# Patient Record
Sex: MALE | Race: WHITE | URBAN - METROPOLITAN AREA
[De-identification: names, ages, dates, MRNs, and addresses within clinical notes are randomized per-mention and may not be internally consistent; named-entity substitution may affect disease eponyms.]

---

## 2017-02-13 ENCOUNTER — HISTORICAL (OUTPATIENT)
Dept: LAB | Facility: HOSPITAL | Age: 47
End: 2017-02-13

## 2019-03-27 ENCOUNTER — HISTORICAL (OUTPATIENT)
Dept: ADMINISTRATIVE | Facility: HOSPITAL | Age: 49
End: 2019-03-27

## 2019-03-27 LAB
PROLACTIN LEVEL (OHS): 11 NG/ML (ref 2.5–17.4)
TESTOST SERPL-MCNC: 2132 NG/DL (ref 241–827)

## 2019-06-26 ENCOUNTER — HISTORICAL (OUTPATIENT)
Dept: ADMINISTRATIVE | Facility: HOSPITAL | Age: 49
End: 2019-06-26

## 2019-06-26 LAB
BUN SERPL-MCNC: 20 MG/DL (ref 7–18)
CALCIUM SERPL-MCNC: 9.5 MG/DL (ref 8.5–10.1)
CHLORIDE SERPL-SCNC: 105 MMOL/L (ref 98–107)
CO2 SERPL-SCNC: 33 MMOL/L (ref 21–32)
CREAT SERPL-MCNC: 1.3 MG/DL (ref 0.6–1.3)
CREAT/UREA NIT SERPL: 15
FSH SERPL-ACNC: <0.2 MIU/ML (ref 1.5–15)
GLUCOSE SERPL-MCNC: 90 MG/DL (ref 74–106)
LH SERPL-ACNC: <0.2 MIU/ML (ref 1.2–10.6)
POTASSIUM SERPL-SCNC: 4.3 MMOL/L (ref 3.5–5.1)
SODIUM SERPL-SCNC: 141 MMOL/L (ref 136–145)
TESTOST SERPL-MCNC: 753 NG/DL (ref 241–827)

## 2019-07-11 ENCOUNTER — HISTORICAL (OUTPATIENT)
Dept: RADIOLOGY | Facility: HOSPITAL | Age: 49
End: 2019-07-11

## 2019-07-24 ENCOUNTER — HISTORICAL (OUTPATIENT)
Dept: ADMINISTRATIVE | Facility: HOSPITAL | Age: 49
End: 2019-07-24

## 2019-10-30 ENCOUNTER — HISTORICAL (OUTPATIENT)
Dept: ADMINISTRATIVE | Facility: HOSPITAL | Age: 49
End: 2019-10-30

## 2019-10-30 LAB
FSH SERPL-ACNC: 0.8 MIU/ML (ref 1.5–15)
LH SERPL-ACNC: 0.8 MIU/ML (ref 1.2–10.6)
TESTOST SERPL-MCNC: 156 NG/DL (ref 241–827)

## 2020-01-29 ENCOUNTER — HISTORICAL (OUTPATIENT)
Dept: ADMINISTRATIVE | Facility: HOSPITAL | Age: 50
End: 2020-01-29

## 2020-01-29 LAB
ABS NEUT (OLG): 3.39 X10(3)/MCL (ref 2.1–9.2)
BASOPHILS # BLD AUTO: 0.1 X10(3)/MCL (ref 0–0.2)
BASOPHILS NFR BLD AUTO: 1 %
BUN SERPL-MCNC: 21 MG/DL (ref 7–18)
CALCIUM SERPL-MCNC: 8.8 MG/DL (ref 8.5–10.1)
CHLORIDE SERPL-SCNC: 106 MMOL/L (ref 98–107)
CO2 SERPL-SCNC: 31 MMOL/L (ref 21–32)
CREAT SERPL-MCNC: 1.2 MG/DL (ref 0.6–1.3)
CREAT/UREA NIT SERPL: 18
EOSINOPHIL # BLD AUTO: 0.1 X10(3)/MCL (ref 0–0.9)
EOSINOPHIL NFR BLD AUTO: 1 %
ERYTHROCYTE [DISTWIDTH] IN BLOOD BY AUTOMATED COUNT: 14.2 % (ref 11.5–14.5)
GLUCOSE SERPL-MCNC: 79 MG/DL (ref 74–106)
HCT VFR BLD AUTO: 44.2 % (ref 40–51)
HGB BLD-MCNC: 13.9 GM/DL (ref 13.5–17.5)
IMM GRANULOCYTES # BLD AUTO: 0.02 10*3/UL
IMM GRANULOCYTES NFR BLD AUTO: 0 %
LYMPHOCYTES # BLD AUTO: 1.7 X10(3)/MCL (ref 0.6–4.6)
LYMPHOCYTES NFR BLD AUTO: 29 %
MCH RBC QN AUTO: 27.3 PG (ref 26–34)
MCHC RBC AUTO-ENTMCNC: 31.4 GM/DL (ref 31–37)
MCV RBC AUTO: 86.7 FL (ref 80–100)
MONOCYTES # BLD AUTO: 0.6 X10(3)/MCL (ref 0.1–1.3)
MONOCYTES NFR BLD AUTO: 11 %
NEUTROPHILS # BLD AUTO: 3.39 X10(3)/MCL (ref 2.1–9.2)
NEUTROPHILS NFR BLD AUTO: 58 %
PLATELET # BLD AUTO: 199 X10(3)/MCL (ref 130–400)
PMV BLD AUTO: 11.2 FL (ref 7.4–10.4)
POTASSIUM SERPL-SCNC: 4.1 MMOL/L (ref 3.5–5.1)
PSA SERPL-MCNC: 0.5 NG/ML
RBC # BLD AUTO: 5.1 X10(6)/MCL (ref 4.5–5.9)
SODIUM SERPL-SCNC: 141 MMOL/L (ref 136–145)
TESTOST SERPL-MCNC: >1500 NG/DL (ref 300–1060)
WBC # SPEC AUTO: 5.8 X10(3)/MCL (ref 4.5–11)

## 2020-02-21 ENCOUNTER — HISTORICAL (OUTPATIENT)
Dept: ADMINISTRATIVE | Facility: HOSPITAL | Age: 50
End: 2020-02-21

## 2020-02-21 LAB
FSH SERPL-ACNC: <1 MIU/ML (ref 1.5–15)
LH SERPL-ACNC: <0.2 MIU/ML (ref 1.2–10.6)

## 2020-06-11 ENCOUNTER — HISTORICAL (OUTPATIENT)
Dept: LAB | Facility: HOSPITAL | Age: 50
End: 2020-06-11

## 2020-06-11 LAB
ALBUMIN SERPL-MCNC: 3.9 GM/DL (ref 3.4–5)
ALBUMIN/GLOB SERPL: 1.1 RATIO (ref 1.1–2)
ALP SERPL-CCNC: 66 UNIT/L (ref 45–117)
ALT SERPL-CCNC: 44 UNIT/L (ref 12–78)
AST SERPL-CCNC: 25 UNIT/L (ref 15–37)
BILIRUB SERPL-MCNC: 0.7 MG/DL (ref 0.2–1)
BILIRUBIN DIRECT+TOT PNL SERPL-MCNC: 0.2 MG/DL (ref 0–0.2)
BILIRUBIN DIRECT+TOT PNL SERPL-MCNC: 0.5 MG/DL
BUN SERPL-MCNC: 18 MG/DL (ref 7–18)
CALCIUM SERPL-MCNC: 8.8 MG/DL (ref 8.5–10.1)
CHLORIDE SERPL-SCNC: 106 MMOL/L (ref 98–107)
CHOLEST SERPL-MCNC: 176 MG/DL
CHOLEST/HDLC SERPL: 2.2 {RATIO} (ref 0–5)
CO2 SERPL-SCNC: 29 MMOL/L (ref 21–32)
CREAT SERPL-MCNC: 1.2 MG/DL (ref 0.6–1.3)
ERYTHROCYTE [DISTWIDTH] IN BLOOD BY AUTOMATED COUNT: 13.3 % (ref 11.5–14.5)
ESTRADIOL SERPL HS-MCNC: <24 PG/ML
FT4I SERPL CALC-MCNC: 2.74 (ref 2.6–3.6)
GLOBULIN SER-MCNC: 3.5 GM/ML (ref 2.3–3.5)
GLUCOSE SERPL-MCNC: 87 MG/DL (ref 74–106)
HCT VFR BLD AUTO: 41.6 % (ref 40–51)
HDLC SERPL-MCNC: 80 MG/DL (ref 40–59)
HGB BLD-MCNC: 13.4 GM/DL (ref 13.5–17.5)
LDLC SERPL CALC-MCNC: 86 MG/DL
LH SERPL-ACNC: <0.2 MIU/ML (ref 1.2–10.6)
MCH RBC QN AUTO: 29.1 PG (ref 26–34)
MCHC RBC AUTO-ENTMCNC: 32.2 GM/DL (ref 31–37)
MCV RBC AUTO: 90.4 FL (ref 80–100)
PLATELET # BLD AUTO: 183 X10(3)/MCL (ref 130–400)
PMV BLD AUTO: 11.2 FL (ref 7.4–10.4)
POTASSIUM SERPL-SCNC: 4 MMOL/L (ref 3.5–5.1)
PROT SERPL-MCNC: 7.4 GM/DL (ref 6.4–8.2)
RBC # BLD AUTO: 4.6 X10(6)/MCL (ref 4.5–5.9)
SODIUM SERPL-SCNC: 140 MMOL/L (ref 136–145)
T3FREE SERPL-MCNC: 3.02 PG/ML (ref 2.18–3.98)
T3RU NFR SERPL: 35.65 % (ref 31–39)
T4 SERPL-MCNC: 7.68 UG/DL (ref 4.87–11.72)
TRIGL SERPL-MCNC: 51 MG/DL
TSH SERPL-ACNC: 2.23 MIU/L (ref 0.36–3.74)
VLDLC SERPL CALC-MCNC: 10 MG/DL
WBC # SPEC AUTO: 4.7 X10(3)/MCL (ref 4.5–11)

## 2020-09-24 ENCOUNTER — HISTORICAL (OUTPATIENT)
Dept: LAB | Facility: HOSPITAL | Age: 50
End: 2020-09-24

## 2020-09-24 LAB
ESTRADIOL SERPL HS-MCNC: <24 PG/ML
TESTOST SERPL-MCNC: 1483.55 NG/DL (ref 240.24–870.68)

## 2021-06-18 ENCOUNTER — HISTORICAL (OUTPATIENT)
Dept: INTERNAL MEDICINE | Facility: CLINIC | Age: 51
End: 2021-06-18

## 2021-06-18 LAB — ESTRADIOL SERPL HS-MCNC: <24 PG/ML

## 2022-04-30 NOTE — PROGRESS NOTES
Patient:   Jon Frazier            MRN: [id number]            FIN: [id number]               Age:   49 years     Sex:  Male     :  1970   Associated Diagnoses:   None   Author:   Mendel Salinas MD      Pt continues to have supertherapeutic T levels on and off as well as hormonal axis suppression with LH FSH.  This indicates some type exogenous supplementation despite our repeated conversastions about such.  If he was only taking clomiphene as Rx then he would have some elevation of LH and FSH to normal range and also would not cause he T to be that high.  I am stopping his Rx and discharging him from my clinic for noncompliance.

## 2022-05-03 NOTE — HISTORICAL [facility] CERNER
This is a historical note converted from [facility]. Formatting and pictures may have been removed.  Please reference [facility] for original formatting and attached multimedia. Chief Complaint  f/u for fertility issues  History of Present Illness  Pt with fertility issues.? Low sperm count on SA.? Was taking OTC workout supplement with T>1500 and axis suppression.? Prolactin mild elevation.? MRI pituitary negative.? Here to discuss such.? We redrew his labs last visit and his T was 2100 and his LH FSH were essentially 0.? He is using exogenous source as there is no other physiologic explanation for such.?  ?   Also mild ED responding to viagra.? Denies CV disease/DM/HTN/new meds.? He is stressed over wedding planning, inferility, unemployment.  ?   He states he got off his T booster but is still taking some [employer] protein mix and prelit pre work out.? I assume they have androgen.? We also talked about the rare change of a testicular or adrenal lesion.?  Review of Systems  12 point ROS negative other than HPI  Physical Exam  Vitals & Measurements  T:?36.4? ?C (Oral)? HR:?68(Peripheral)? RR:?18? BP:?142/85? SpO2:?97%? WT:?111.3?kg?  GEN: WNWD NAD  HEENT: NCAT  CV: RRR  RESP: unlabored  ABD: soft NT/ND  : Bladder nondistended  EXT: no C/C/E  NEURO: AAOx4 no focal deficits  Assessment/Plan  1.?Impotence?N52.9  -Will get testicular US and CT adrenal protocol  -Repeat SA in 3 months if he actually stops exogenous.? It is a waste of time otherwise.  -Cont viagra and behavior mod   Problem List/Past Medical History  Ongoing  Back pain  [place] palsy  Excessive daytime sleepiness  Gastroesophageal reflux disease  Hypoxemia  Impotence  Obesity  EMILY (obstructive sleep apnea)  Pneumonia  S/P bariatric surgery  Snoring  Teeth grinding  Tobacco user  Varicose veins  Historical  Sleep apnea  Procedure/Surgical History  Laparoscopic Vertical (Sleeve) Gastrectomy (10/30/2012)  Gastic Sleeve (2011)  lt ring finger pin placement    Medications  amoxicillin-clavulanate 875 mg-125 mg oral tablet, 1 tab(s), Oral, BID,? ?Not taking  Anusol-HC 2.5% rectal cream with applicator  calcium polycarbophil 625 mg oral tablet,? ?Not taking  clindamycin IVPB, 900 mg= 50 mL, IV Piggyback, Once  DIAZepam 10 mg oral tablet,? ?Not taking  ibuprofen 800 mg oral tablet, 800 mg= 1 tab(s), Oral, TID  Pantoprazole 40 mg ORAL EC-Tablet, 40 mg= 1 tab(s), Oral, Daily  prednisONE 20 mg oral tablet, 20 mg= 1 tab(s), Oral, BID,? ?Not taking  Viagra 50 mg oral tablet, 50 mg= 1 tab(s), Oral, Daily  Allergies  No Known Allergies  Social History  Alcohol - Denies Alcohol Use, 10/29/2012  Past, Beer, Liquor, Daily, 10/30/2012  Employment/School  Unemployed, 03/27/2019  Exercise  Exercise duration: 120. Exercise frequency: 5-6 times/week. Self assessment: Good condition. Exercise type: Weight lifting., 03/27/2019  Home/Environment  Lives with friend. Living situation: Home/Independent. Home equipment: CPAP/BiPAP. Alcohol abuse in household: No. Substance abuse in household: No. Smoker in household: No. Feels unsafe at home: No. Safe place to go: Yes. Family/Friends available for support: Yes. Concern for family members at home: No. Major illness in household: No. Financial concerns: No. TV/Computer concerns: No., 03/27/2019  Nutrition/Health  Regular, 03/27/2019  Sexual  Sexually active: Yes. Sexual orientation: Straight or heterosexual. Gender Identity Identifies as male., 03/27/2019  Substance Use  Past, alchol, 10/30/2012  Tobacco  Former smoker, quit more than 30 days ago, N/A, 05/08/2019  Former smoker, Cigarettes, 03/07/2018  Current every day smoker, Cigarettes, 8 per day., 10/14/2016  Past, 10 per day., 10/29/2012  Family History  Atrial fibrillation: Father.  HTN (hypertension) 27-APR-2016 23:35:49<$>: Mother.  Health Maintenance  Health Maintenance  ???Pending?(in the next year)  ??? ??OverDue  ??? ? ? ?Alcohol Misuse Screening due??01/01/19??and every  1??year(s)  ??? ? ? ?Smoking Cessation due??01/01/19??and every 1??year(s)  ??? ??Due?  ??? ? ? ?ADL Screening due??06/26/19??and every 1??year(s)  ??? ? ? ?Aspirin Therapy for CVD Prevention due??06/26/19??and every 1??year(s)  ??? ? ? ?Diabetes Screening due??06/26/19??and every?  ??? ? ? ?Lipid Screening due??06/26/19??and every?  ??? ? ? ?Tetanus Vaccine due??06/26/19??and every 10??year(s)  ??? ??Due In Future?  ??? ? ? ?Obesity Screening not due until??01/01/20??and every 1??year(s)  ??? ? ? ?Depression Screening not due until??03/26/20??and every 1??year(s)  ??? ? ? ?Blood Pressure Screening not due until??05/07/20??and every 1??year(s)  ??? ? ? ?Body Mass Index Check not due until??05/07/20??and every 1??year(s)  ???Satisfied?(in the past 1 year)  ??? ??Satisfied?  ??? ? ? ?Blood Pressure Screening on??06/26/19.??Satisfied by [facility] DEANN, Adrianne  ??? ? ? ?Body Mass Index Check on??06/26/19.??Satisfied by [facility] LPN, Adrianne  ??? ? ? ?Depression Screening on??06/26/19.??Satisfied by [facility] LPN, Adrianne  ??? ? ? ?Obesity Screening on??06/26/19.??Satisfied by [facility] LPN, Adrianne  ?

## 2022-05-03 NOTE — HISTORICAL [facility] C[facility]
This is a historical note converted from [facility]. Formatting and pictures may have been removed.  Please reference [facility] for original formatting and attached multimedia. Chief Complaint  new referral for increased testosterone, increase prolactin  History of Present Illness  Pt with fertility issues.? Low sperm count on SA.? Was taking OTC workuot supplement with T>1500 and axis suppression.? Prolactin mild elevation.? MRI pituitary negative.? Her eto discuss such.  ?  Also mild ED responding to viagra.? Denies CV disease/DM/HTN/new meds.? He is stressed over wedding planning, inferility, unemployment.  Review of Systems  12 point ROS negative other than HPI  Physical Exam  Vitals & Measurements  T:?36.5? ?C (Oral)? HR:?75(Peripheral)? RR:?18? BP:?154/82? WT:?112.1?kg?  GEN: WNWD NAD  HEENT: NCAT  CV: RRR  RESP: unlabored  ABD: soft NT/ND  : NEMG  EXT: no C/C/E  NEURO: AAOx4 no focal deficits  Assessment/Plan  1.?Impotence  -Repeat labs today.? If still axis suppression clomid x3 months.?  -Repeat SA in 3 months  -Cont viagra and behavior mod  Ordered:  Clinic Follow up  Follicle Stimulating Hormone-[facility] [id number]  Luteinizing Hormone-[facility] [id number]  Office/Outpatient Visit Level 3 Established [id number] PC  Prolactin  Testosterone Level Total  ?   Problem List/Past Medical History  Ongoing  Back pain  [place] palsy  Excessive daytime sleepiness  Gastroesophageal reflux disease  Hypoxemia  Impotence  Obesity  EMILY (obstructive sleep apnea)  Pneumonia  S/P bariatric surgery  Snoring  Teeth grinding  Tobacco user  Varicose veins  Historical  Sleep apnea  Procedure/Surgical History  Laparoscopic Vertical (Sleeve) Gastrectomy (10/30/2012)  Gastic Sleeve (2011)  lt ring finger pin placement   Medications  clindamycin IVPB, 900 mg= 50 mL, IV Piggyback, Once  Viagra 50 mg oral tablet, 50 mg= 1 tab(s), Oral, Daily  Allergies  No Known Allergies  Social History  Alcohol - Denies Alcohol Use, 10/29/2012  Past, Beer, Liquor, Daily,  10/30/2012  Employment/School  Unemployed, 03/27/2019  Exercise  Exercise duration: 120. Exercise frequency: 5-6 times/week. Self assessment: Good condition. Exercise type: Weight lifting., 03/27/2019  Home/Environment  Lives with friend. Living situation: Home/Independent. Home equipment: CPAP/BiPAP. Alcohol abuse in household: No. Substance abuse in household: No. Smoker in household: No. Feels unsafe at home: No. Safe place to go: Yes. Family/Friends available for support: Yes. Concern for family members at home: No. Major illness in household: No. Financial concerns: No. TV/Computer concerns: No., 03/27/2019  Nutrition/Health  Regular, 03/27/2019  Sexual  Sexually active: Yes. Sexual orientation: Straight or heterosexual. Gender Identity Identifies as male., 03/27/2019  Substance Abuse  Past, alchol, 10/30/2012  Tobacco  Former smoker, Cigarettes, 03/07/2018  Current every day smoker, Cigarettes, 8 per day., 10/14/2016  Past, 10 per day., 10/29/2012  Family History  Atrial fibrillation: Father.  HTN (hypertension) 27-APR-2016 23:35:49<$>: Mother.  Health Maintenance  Health Maintenance  ???Pending?(in the next year)  ??? ??Due?  ??? ? ? ?ADL Screening due??03/27/19??and every 1??year(s)  ??? ? ? ?Alcohol Misuse Screening due??03/27/19??and every 1??year(s)  ??? ? ? ?Aspirin Therapy for CVD Prevention due??03/27/19??and every 1??year(s)  ??? ? ? ?Diabetes Screening due??03/27/19??and every?  ??? ? ? ?Lipid Screening due??03/27/19??and every?  ??? ? ? ?Tetanus Vaccine due??03/27/19??and every 10??year(s)  ??? ??Due In Future?  ??? ? ? ?Smoking Cessation not due until??04/25/19??and every 1??year(s)  ??? ? ? ?Blood Pressure Screening not due until??03/26/20??and every 1??year(s)  ??? ? ? ?Body Mass Index Check not due until??03/26/20??and every 1??year(s)  ??? ? ? ?Depression Screening not due until??03/26/20??and every 1??year(s)  ???Satisfied?(in the past 1 year)  ??? ??Satisfied?  ??? ? ? ?Blood Pressure  Screening on??03/27/19.??Satisfied by Abdiel Trent RN  ??? ? ? ?Body Mass Index Check on??03/27/19.??Satisfied by Abdiel Trent RN  ??? ? ? ?Depression Screening on??03/27/19.??Satisfied by Abdiel Trent RN  ??? ? ? ?Obesity Screening on??03/27/19.??Satisfied by Abdiel rTent RN  ??? ? ? ?Smoking Cessation on??04/25/18.??Satisfied by Deb Amaya RRT  ?  ?

## 2022-05-03 NOTE — HISTORICAL [facility] C[facility]
This is a historical note converted from [facility]ary. Formatting and pictures may have been removed.  Please reference [facility]ary for original formatting and attached multimedia. Procedure Name  Date/Time:7/24/2019 10:17:18  Procedure:??Right?Knee Injection  Indications:??Diagnostic and Therapeutic Indication - decrease pain, increase range of motion and improve quality of life  RISKS: Possible complications with injection include?bleeding, infection (0.01%), tendon rupture, steroid flare, fat pad or soft tissue atrophy, skin depigmentation, and vasovagal response. ?(Steroid flair treatment is rest, ice, NSAIDs and resolves in 24-36 hours.)  Consent:???Procedure, risks, benefits, & alternatives explained to patient, who voiced understanding and agreed to proceed with procedure. ?Consent signed and?scanned into the medical record. No absolute contraindications (cellulitis overlying joint, infection, lack of informed consent, allergy to injection mediation, lauren protein or egg allergy for sodium hyaluronate, or history of steroid flare) or relative contraindications (brittle or out of control DM HgA1c > 10, coagulopathy INR > 3.5, previous joint replacement, or history of AVN).  Description:?Time out performed. The patient was prepped?using Chlorhexidine scrub after the appropriate?identification of anatomic landmarks.? Sterile needle used (size # 21 gauge, length 1.5 inch)?Topical anesthetic of ethyl chloride was used.? ?5 mL of 1% lidocaine plain with 40 mg of Kenalog injected.  Complications:?None?  EBL:??None  Post Procedure:?Patient reports improvement in pain and ROM. Patient alert, moving all extremities. ?Good peripheral pulses, no signs of vascular compromise, range of motion intact. ?Patient tolerated procedure well. ?Aftercare instructions were given to patient at time of discharge.??Relative rest for 3 days - avoiding excessive activity. ?Pendulum stretching exercises followed by stretching exercises  daily?starting on day 4.? Place ice on area for 15 minutes every 4 to 6 hours.??Tylenol 1000mg twice a day or ibuprofen 600 mg three times per day for next 3-4 days if not on medication already. ?Protect the area for the next 1-8 hours if anesthetic was used. ?Avoid excessive activity for next 3 to 4 weeks.?ER precautions for fever, severe joint pain, or allergic reaction or other new symptoms related to joint injection.

## 2022-05-03 NOTE — HISTORICAL [facility] CERNER
This is a historical note converted from Zachary. Formatting and pictures may have been removed.  Please reference Zachary for original formatting and attached multimedia. Chief Complaint  referral for right knee pain. c/o bilateral knee pain  History of Present Illness  49?yo?male?non-smoker?  Today:??New?patient presents to ortho clinic for? ?initial visit?for???bilateral ?knee? ?pain? BMI??decreased to???32%?;? reports right knee?pain?as a result of an injury?when he was trying to restrain a?patient?and he kicked his knee?2-3 times he feels?a right medial?knee burning pain?with certain positions.?  Onset:??April 2019????fluctuates in intensity  Current pain level: 7/10?R>L? points to?medial knee? ?without pain medication. Quality described as??sharp?.? Patient?denies?use of pain medication today.?  Medications r/t complaint: Patient reports using?RX / OTC?medications in past including:?OTC pain relievers,?ibuprofen 800 mg, Toradol?RX per PCP. ?Currently taking?same??PRN?pain with?no?relief.?  Modifying Factors: ?Worse with/after activity;?Improved with rest;??Stiffness after immobilization??Stiffness improved with less than 30 minutes of activity  Injury:?trying to restrain a?patient?and he kicked his knee?2-3 times he feels?a right medial?knee  Previous treatment:?HEP???denies??; RX NSAIDs, PT??denies ?, CSI denies , VS denies  Associated Symptoms:?Crepitus/Grinding; ?No numbness or tingling;??No swelling;?No skin changes;?Weakness of right knee without falls;?Mild decrease in ROM;?difficulty sleeping at night s/t pain  Activity:?Sedentary, full ADLs;?Pain interferes with function/daily activity (mild)?Patient?denies?use of assistive devices??for assistance with ambulation.  PMH:? denies CVD; denies DM ; denies RA; denies gout; denies RX anticoagulants; denies intolerance to NSAIDs s/t GI issues; denies ?intolerance to NSAIDs s/t kidney disease  Family History:?Family history of arthritis  PCP:?Eun Trent NP @  [facility] [place], [place]??, referral source Gene Mcneill NP [facility] [place]  Employment:? 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 ?employed  Note:??none  Review of Systems  Constitutional:No fever;No chills;No weight loss  CV:No swelling;No edema  GI:No urinary retention;No urinary incontinence  :No fecal incontinence  Skin:No rash;No wound  Neuro:No numbness/tingling;No weakness;No saddle anesthesia  MSK: As above  Psych:No depression;No anxiety  Heme/Lymph:No easy bruising;No easy bleeding;No lymphadenopathy  Immuno:No MRSA history  Physical Exam  Vitals & Measurements  HT:?186?cm? WT:?108.8?kg? BMI:?31.45?  MSK: ?Bilateral??KNEE  General: No apparent distress;?well-nourished  Inspection:?limping;??full weight bearing;??no swelling;?no erythema;?No contusion;?atrophy / deconditioning noted- mild quad?right;?normal alignment?  Palpation:?tenderness noted at lateral and medial joint lines;tenderness noted at medial retinaculum?Crepitus:?Negative;?Normal temperature  ROM:?  ?????Active Extension/Flexion (0-140):?0-140 (L); 5-120 (R)  Strength:?  ?????Flexion??4/5  ?????Extension??4/5  Special Tests:  ?????a) Effusion Testing:  ??????????Ballotable Effusion: ?Negative  ??????????Fluid Wave:?Negative  ?????b) Patellar Testing:  ??????????Patellar Grind: ?Negative  ??????????Apprehension:?Negative  ??????????Patella?Facet?Tenderness:?Negative  ?????c) Meniscal Tear Assessment:?  ??????????Noe:?Positive?right; negative left  ??????????Apleys Compression:?Positive? right; negative left  ?????d) Ligamentous Testing:  ?????????ACL Anterior Drawer:?Negative  ?????????PCL Posterior Drawer:?Negative  ??? ?????LCL Varus Test:?Negative  ?????????MCL Valgus Test:?Negative?????  Neurovascular:?Intact; 2+?distal pulse, sensation intact to light touch  Neuro/Psych: Awake, Alert, Oriented; Normal mood and affect  Lymphatic: No LAD  Skin and Soft Tissue: No bruising, No  ecchymosis; No rash; Skin intact  Assessment/Plan  1.?Injury of meniscus of right knee?S83.8X1A  ?1.? Discussed with patient diagnosis and treatment recommendations.? Handout given.  2.? Imaging:?Radiological studies ordered,?reviewed,?and independently interpreted by me; discussed with patient.  3.? Treatment plan: Conservative treatment to include oral glucosamine 1500 mg/day; Topical capsaicin as needed; Hot or cold therapy; Adequate vitamin C/D intake  4.? Procedure:?Discussed CSI vs VS injections as treatment options; since conservative measures did not improve symptoms patient consented for CSI today?right knee only  5.? Activity:?Activity as tolerated; HEP to included aerobic conditioning with non-painful activity and ROM/STG exercises;?recommend exercise bike with RPM set at 80 or higher build to 40 minutes 3xs per week as tolerated; ?proper footwear; assistive device to avoid limping;?rubber band provided for HEP and soft knee splint provided for PRN comfort?  6.? Therapy:?Formal PT/OT ordered  7.? Medication:?First line treatment with daily ?acetaminophen 1000 mg three times daily; Medication precautions given; continue medications as RX per PCP; prescribe meloxicam daily for symptom relief, medication precautions given.  8.? RTC:?3 months  9.? Additional work-up:??None  Ordered:  Lidocaine inj., 5 mL, form: Injection, Intra-Articular, Once, first dose 07/24/19 9:58:00 CDT, stop date 07/24/19 9:58:00 CDT  triamcinolone, 40 mg, form: Injection, Intra-Articular, Once, first dose 07/24/19 9:58:00 CDT, stop date 07/24/19 9:58:00 CDT  Clinic Follow up, *Est. 10/24/19 3:00:00 CDT, Order for future visit, Injury of meniscus of right knee, [facility]  Office/Outpatient Visit Level 4 Established 25802 PC, Injury of meniscus of right knee, [facility] 25, 07/24/19 9:58:00 CDT  ?  2.?Osteoarthritis of knees, bilateral?M17.0  ?Same as above  ?  3.?Obesity?E66.9  Patient educated that diet modifications with low  impact exercises as tolerated for reduction in BMI would improve overall treatment and outcome.?  ?  Orders:  meloxicam, 15 mg = 1 tab(s), Oral, Daily, with food; Do NOT take with other NSAIDs, # 30 tab(s), 3 Refill(s), Pharmacy: [id number]  asp/inj jnt/bursa, major [id number] PC, 07/24/19 9:58:00 CDT, [facility], Routine, 07/24/19 9:58:00 CDT  Referrals  Clinic Follow up, *Est. 10/24/19 3:00:00 CDT, Order for future visit, Injury of meniscus of right knee, [facility]   Problem List/Past Medical History  Ongoing  Back pain  Abercrombie palsy  Excessive daytime sleepiness  Former smoker  Gastroesophageal reflux disease  Hypoxemia  Impotence  Obesity  EMILY (obstructive sleep apnea)  Pneumonia  S/P bariatric surgery  Snoring  Teeth grinding  Tobacco user  Varicose veins  Historical  Sleep apnea  Procedure/Surgical History  Laparoscopic Vertical (Sleeve) Gastrectomy (10/30/2012)  Gastic Sleeve (2011)  lt ring finger pin placement   Medications  amoxicillin-clavulanate 875 mg-125 mg oral tablet, 1 tab(s), Oral, BID,? ?Not taking  Anusol-HC 2.5% rectal cream with applicator,? ?Not taking  calcium polycarbophil 625 mg oral tablet,? ?Not taking  clindamycin IVPB, 900 mg= 50 mL, IV Piggyback, Once  DIAZepam 10 mg oral tablet, 10 mg= 1 tab(s), Oral, TID,? ?Not taking  ibuprofen 800 mg oral tablet, 800 mg= 1 tab(s), Oral, TID,? ?Not taking  Kenalog-40 injectable suspension, 40 mg= 1 mL, Intra-Articular, Once  Lidocaine 1% PF 5ml inj, 5 mL, Intra-Articular, Once  Linzess 145 mcg oral capsule, 145 mcg= 1 cap(s), Oral, Daily  Mobic 15 mg oral tablet, 15 mg= 1 tab(s), Oral, Daily, 3 refills  Pantoprazole 40 mg ORAL EC-Tablet, 40 mg= 1 tab(s), Oral, Daily  prednisONE 20 mg oral tablet, 20 mg= 1 tab(s), Oral, BID,? ?Not taking  Toradol 10 mg oral tablet, 10 mg= 1 tab(s), Oral, QID,? ?Not Taking, Completed Rx  Viagra 50 mg oral tablet, 50 mg= 1 tab(s), Oral, Daily  Allergies  No Known Allergies  Social  History  Abuse/Neglect  No, 07/10/2019  Alcohol - Denies Alcohol Use, 10/29/2012  Past, Beer, Liquor, Daily, 10/30/2012  Employment/School  Unemployed, 03/27/2019  Exercise  Exercise duration: 120. Exercise frequency: 5-6 times/week. Self assessment: Good condition. Exercise type: Weight lifting., 03/27/2019  Home/Environment  Lives with friend. Living situation: Home/Independent. Home equipment: CPAP/BiPAP. Alcohol abuse in household: No. Substance abuse in household: No. Smoker in household: No. Feels unsafe at home: No. Safe place to go: Yes. Family/Friends available for support: Yes. Concern for family members at home: No. Major illness in household: No. Financial concerns: No. TV/Computer concerns: No., 03/27/2019  Nutrition/Health  Regular, 03/27/2019  Sexual  Sexually active: Yes. Sexual orientation: Straight or heterosexual. Gender Identity Identifies as male., 03/27/2019  Substance Use  Past, alchol, 10/30/2012  Tobacco  Former smoker, quit more than 30 days ago, N/A, 07/24/2019  Family History  Atrial fibrillation: Father.  HTN (hypertension) 27-APR-2016 23:35:49<$>: Mother.  Health Maintenance  Health Maintenance  ???Pending?(in the next year)  ??? ??OverDue  ??? ? ? ?Diabetes Screening due??and every?  ??? ? ? ?Alcohol Misuse Screening due??01/01/19??and every 1??year(s)  ??? ? ? ?Smoking Cessation due??01/01/19??and every 1??year(s)  ??? ??Due?  ??? ? ? ?ADL Screening due??07/24/19??and every 1??year(s)  ??? ? ? ?Aspirin Therapy for CVD Prevention due??07/24/19??and every 1??year(s)  ??? ? ? ?Lipid Screening due??07/24/19??and every?  ??? ? ? ?Tetanus Vaccine due??07/24/19??and every 10??year(s)  ??? ??Due In Future?  ??? ? ? ?Obesity Screening not due until??01/01/20??and every 1??year(s)  ??? ? ? ?Depression Screening not due until??06/25/20??and every 1??year(s)  ??? ? ? ?Blood Pressure Screening not due until??07/09/20??and every 1??year(s)  ??? ? ? ?Body Mass Index Check not due  until??07/09/20??and every 1??year(s)  ???Satisfied?(in the past 1 year)  ??? ??Satisfied?  ??? ? ? ?Blood Pressure Screening on??07/24/19.??Satisfied by Adrianne Hills LPN  ??? ? ? ?Body Mass Index Check on??07/24/19.??Satisfied by Delmy Jones RN  ??? ? ? ?Depression Screening on??07/24/19.??Satisfied by Adrianne Hills LPN  ??? ? ? ?Diabetes Screening on??06/26/19.??Satisfied by Xavier Bowser Jr.  ??? ? ? ?Obesity Screening on??07/24/19.??Satisfied by Delmy Jones RN  ?  Diagnostic Results  07/24/2019 09:41 CDT XR Knee Left 4 or More Views  Radiology Report  Comparison: Radiograph contralateral knee used for comparison dated  7/24/2019  INDICATION: Bilateral knee pain.  FINDINGS:  Right knee: The bones are well-mineralized and appear intact. Medial  compartment joint space narrowing and hypertrophic change.  Moderate-sized knee joint effusion. Patella is anatomic.  Left knee: The bones are well-mineralized and appear intact. Medial  compartment joint space narrowing and hypertrophic change. Small knee  joint effusion. Patella is anatomic. Dystrophic soft tissue  calcification seen to the medial leg.  IMPRESSION:  Relatively mild osteoarthritic changes are present bilaterally and  worst in the medial compartments.  Mild to moderate bilateral knee joint effusions which are nonspecific  07/24/2019 09:41 CDT XR Knee Right 4 or More Views  Radiology Report  Comparison: Radiograph contralateral knee used for comparison dated  7/24/2019  INDICATION: Bilateral knee pain.  FINDINGS:  Right knee: The bones are well-mineralized and appear intact. Medial  compartment joint space narrowing and hypertrophic change.  Moderate-sized knee joint effusion. Patella is anatomic.  Left knee: The bones are well-mineralized and appear intact. Medial  compartment joint space narrowing and hypertrophic change. Small knee  joint effusion. Patella is anatomic. Dystrophic soft tissue  calcification seen to the medial  leg.  IMPRESSION:  Relatively mild osteoarthritic changes are present bilaterally and  worst in the medial compartments.  Mild to moderate bilateral knee joint effusions which are nonspecific

## 2022-05-03 NOTE — HISTORICAL [facility] CERNER
This is a historical note converted from Zachary. Formatting and pictures may have been removed.  Please reference Zachary for original formatting and attached multimedia. Chief Complaint  rtc 3 months with impotence  History of Present Illness  Pt with fertility issues.? Low sperm count on SA.? Was taking OTC workout supplement with T>1500 and axis suppression.? Prolactin mild elevation.? MRI pituitary negative.? Here to discuss such.? We redrew his labs last visit and his T was 2100 and his LH FSH were essentially 0.? He is using exogenous source as there is no other physiologic explanation for such.?  ?   Also mild ED responding to viagra.? Denies CV disease/DM/HTN/new meds.? He is stressed over wedding planning, infertility, unemployment.  ?   He states he got off his T booster but is still taking some [employer] protein mix and prelit pre work out.? I assume they have androgen.? We also talked about the rare change of a testicular or adrenal lesion.? CT and testicular US negative.  ?   Labs in June shows low FSH and LH.? T did come down from 2132 in March to 753.? He had SA at [facility].? I am waiting on results.? We are going to redraw his labs today as well.  Review of Systems  12 point ROS negative other than HPI  Physical Exam  Vitals & Measurements  T:?36.8? ?C (Oral)? HR:?59(Peripheral)? RR:?20? BP:?135/72? WT:?110.1?kg?  GEN: WNWD NAD  HEENT: NCAT  CV: RRR  RESP: unlabored  ABD: soft NT/ND  : NEMG  EXT: no C/C/E  NEURO: AAOx4 no focal deficits  Assessment/Plan  1.?Impotence?N52.9  -Testicular US and CT adrenal protocol negative  -Repeat SA in 3 months if he actually stops exogenous.? It is a waste of time otherwise.  -Cont viagra and behavior mod?  -His labs in June shows his T level has come down greatly.? His LH and FSH are low likely from hormonal axis suppression.? Will start clomid 50mg daily.? Labs today and repeat labs in 3 months.? I will review his SA.  Ordered:  clomiPHENE, 50 mg = 1 tab(s), Oral,  Daily, X 30 day(s), # 30 tab(s), 5 Refill(s), Pharmacy: [facility] #[id number]  Clinic Follow up, *Est. 01/30/20 3:00:00 CST, Order for future visit, Impotence, [facility]  Follicle Stimulating Hormone Level, Routine collect, 10/30/19 8:16:00 CDT, Blood, Order for future visit, Stop date 10/30/19 8:16:00 CDT, Lab Collect, Impotence, 10/30/19 8:16:00 CDT  Luteinizing Hormone Level, Routine collect, 10/30/19 8:16:00 CDT, Blood, Order for future visit, Stop date 10/30/19 8:16:00 CDT, Lab Collect, Impotence, 10/30/19 8:16:00 CDT  Office/Outpatient Visit Level 3 Established 13231 PC, Impotence, [facility], 10/30/19 8:16:00 CDT  Testosterone Level Total, Routine collect, 10/30/19 8:16:00 CDT, Blood, Order for future visit, Stop date 10/30/19 8:16:00 CDT, Lab Collect, Impotence, 10/30/19 8:16:00 CDT  ?   Problem List/Past Medical History  Ongoing  Back pain  [place] palsy  Excessive daytime sleepiness  Former smoker  Gastroesophageal reflux disease  Hypoxemia  Impotence  Obesity  EMILY (obstructive sleep apnea)  Pneumonia  S/P bariatric surgery  Snoring  Teeth grinding  Tobacco user  Varicose veins  Historical  Sleep apnea  Procedure/Surgical History  Laparoscopic Vertical (Sleeve) Gastrectomy (10/30/2012)  Gastic Sleeve (2011)  lt ring finger pin placement (1988)   Medications  clindamycin IVPB, 900 mg= 50 mL, IV Piggyback, Once  clomiPHENE 50 mg oral tablet, 50 mg= 1 tab(s), Oral, Daily, 5 refills  Linzess 145 mcg oral capsule, 145 mcg= 1 cap(s), Oral, Daily  Mobic 15 mg oral tablet, 15 mg= 1 tab(s), Oral, Daily, 3 refills  Pantoprazole 40 mg ORAL EC-Tablet, 40 mg= 1 tab(s), Oral, Daily  Viagra 50 mg oral tablet, 50 mg= 1 tab(s), Oral, Daily  Allergies  No Known Allergies  Social History  Abuse/Neglect  No, 10/16/2019  No, 07/10/2019  Alcohol - Denies Alcohol Use, 10/29/2012  Past, Beer, Liquor, Daily, 10/30/2012  Employment/School  Unemployed, 03/27/2019  Exercise  Exercise duration: 120. Exercise frequency: 5-6  times/week. Self assessment: Good condition. Exercise type: Weight lifting., 03/27/2019  Home/Environment  Lives with friend. Living situation: Home/Independent. Home equipment: CPAP/BiPAP. Alcohol abuse in household: No. Substance abuse in household: No. Smoker in household: No. Feels unsafe at home: No. Safe place to go: Yes. Family/Friends available for support: Yes. Concern for family members at home: No. Major illness in household: No. Financial concerns: No. TV/Computer concerns: No., 03/27/2019  Nutrition/Health  Regular, 03/27/2019  Sexual  Sexually active: Yes. Sexual orientation: Straight or heterosexual. Gender Identity Identifies as male., 03/27/2019  Substance Use  Past, alchol, 10/30/2012  Tobacco  Former smoker, quit more than 30 days ago, No, 10/16/2019  Former smoker, quit more than 30 days ago, N/A, 07/24/2019  Family History  Atrial fibrillation: Father.  HTN (hypertension) 27-APR-2016 23:35:49<$>: Mother.  Health Maintenance  Health Maintenance  ???Pending?(in the next year)  ??? ??OverDue  ??? ? ? ?Diabetes Screening due??and every?  ??? ? ? ?Alcohol Misuse Screening due??01/01/19??and every 1??year(s)  ??? ? ? ?Smoking Cessation due??01/01/19??and every 1??year(s)  ??? ??Due?  ??? ? ? ?ADL Screening due??10/30/19??and every 1??year(s)  ??? ? ? ?Aspirin Therapy for CVD Prevention due??10/30/19??and every 1??year(s)  ??? ? ? ?Lipid Screening due??10/30/19??and every?  ??? ? ? ?Tetanus Vaccine due??10/30/19??and every 10??year(s)  ??? ??Due In Future?  ??? ? ? ?Obesity Screening not due until??01/01/20??and every 1??year(s)  ??? ? ? ?Depression Screening not due until??07/23/20??and every 1??year(s)  ??? ? ? ?Blood Pressure Screening not due until??10/29/20??and every 1??year(s)  ??? ? ? ?Body Mass Index Check not due until??10/29/20??and every 1??year(s)  ???Satisfied?(in the past 1 year)  ??? ??Satisfied?  ??? ? ? ?Blood Pressure Screening on??10/30/19.??Satisfied by Abdiel Trent RN  ??? ?  ? ?Body Mass Index Check on??10/30/19.??Satisfied by Abdiel Trent RN  ??? ? ? ?Depression Screening on??07/24/19.??Satisfied by Adrianne Hills LPN  ??? ? ? ?Diabetes Screening on??06/26/19.??Satisfied by Xavier Bowser Jr.  ??? ? ? ?Obesity Screening on??10/30/19.??Satisfied by Abdiel Tretn RN  ?

## 2022-05-03 NOTE — HISTORICAL [facility] CERNER
This is a historical note converted from [facility]. Formatting and pictures may have been removed.  Please reference Zachary for original formatting and attached multimedia. Chief Complaint  3 mnth RTC Impotence  History of Present Illness  Pt with fertility issues.? Low sperm count on SA.? Was taking OTC workout supplement with T>1500 and axis suppression.? Prolactin mild elevation.? MRI pituitary negative.? Here to discuss such.? We redrew his labs last visit and his T was 2100 and his LH FSH were essentially 0.? He is using exogenous source as there is no other physiologic explanation for such.?  ?   Also mild ED responding to viagra.? Denies CV disease/DM/HTN/new meds.? He is stressed over wedding planning, infertility, unemployment.  ?   He states he got off his T booster but is still taking some [employer] protein mix and prelit pre work out.? I assume they have androgen.? We also talked about the rare change of a testicular or adrenal lesion.? CT and testicular US negative.  ?   Labs in June shows low FSH and LH.? T did come down from 2132 in March to 753.? He was off everything in October and labs were down.? We started him on clomid.? Feeling good today.? He had SA at [facility] showing azoospermia.? Needs repeat labs.  Review of Systems  12 point ROS negative other than HPI  Physical Exam  Vitals & Measurements  T:?37.0? ?C (Oral)? HR:?74(Peripheral)? RR:?9? BP:?114/59? SpO2:?98%? HT:?190?cm? WT:?115?kg?  GEN: WNWD NAD  HEENT: NCAT  CV: RRR  RESP: unlabored  ABD: soft NT/ND  : NEMG  EXT: no C/C/E  NEURO: AAOx4 no focal deficits  Assessment/Plan  1.?Infertility male?N46.9  -Testicular US and CT adrenal protocol negative  -Repeat SA and labs today after 3 months clomid.  -Cont viagra and behavior mod?  -His labs in June shows his T level has come down greatly.? His LH and FSH are low likely from hormonal axis suppression.? Will start clomid 50mg daily.? Labs today and repeat labs in 3 months.?  Ordered:  Basic Metabolic  Panel, Now collect, 01/29/20 12:09:00 CST, Blood, Stop date 01/29/20 12:11:00 CST, Lab Collect, Infertility male, 01/29/20 12:09:00 CST  CBC w/ Auto Diff, Now collect, 01/29/20 12:09:00 CST, Blood, Stop date 01/29/20 12:11:00 CST, Lab Collect, Infertility male, 01/29/20 12:09:00 CST  CBC w/ Auto Diff, Now collect, 01/29/20 12:09:00 CST, Blood, Stop date 01/29/20 12:11:00 CST, Lab Collect, Infertility male, 01/29/20 12:09:00 CST  Clinic Follow up, *Est. 07/29/20 3:00:00 CDT, Order for future visit, Infertility male, [facility]  Office/Outpatient Visit Level 3 Established [id number] PC, Infertility male, [facility] C, 01/29/20 12:09:00 CST  Prostate Specific Antigen, Now collect, 01/29/20 12:09:00 CST, Blood, Stop date 01/29/20 12:11:00 CST, Lab Collect, Infertility male, 01/29/20 12:09:00 CST  Testosterone Level Total, Now collect, 01/29/20 12:09:00 CST, Blood, Stop date 01/29/20 12:11:00 CST, Lab Collect, Infertility male, 01/29/20 12:09:00 CST  ?   Problem List/Past Medical History  Ongoing  Back pain  [place] palsy  Excessive daytime sleepiness  Former smoker  Gastroesophageal reflux disease  Hypoxemia  Impotence  Infertility male  Obesity  EMILY (obstructive sleep apnea)  Pneumonia  S/P bariatric surgery  Snoring  Teeth grinding  Tobacco user  Varicose veins  Historical  Sleep apnea  Procedure/Surgical History  Laparoscopic Vertical (Sleeve) Gastrectomy (10/30/2012)  Gastic Sleeve (2011)  lt ring finger pin placement (1988)   Medications  Afluria PF Quadrivalent [id number] intramuscular suspension, 0.5 mL, IM, Once,? ?Not taking  amoxicillin-clavulanate 875 mg-125 mg oral tablet, 1 tab(s), Oral, BID,? ?Not Taking, Completed Rx  clindamycin IVPB, 900 mg= 50 mL, IV Piggyback, Once  clomiPHENE 50 mg oral tablet, 50 mg= 1 tab(s), Oral, Daily, 5 refills  diclofenac sodium 75 mg oral delayed release tablet, 75 mg= 1 tab(s), Oral, BID, 2 refills  Linzess 145 mcg oral capsule, 145 mcg= 1 cap(s), Oral, Daily  meloxicam 15 mg  oral tablet, 15 mg= 1 tab(s), Oral, Daily,? ?Not taking  methocarbamol 500 mg oral tablet, 1000 mg= 2 tab(s), Oral, QID,? ?Not taking  Pantoprazole 40 mg ORAL EC-Tablet, 40 mg= 1 tab(s), Oral, Daily  Viagra 50 mg oral tablet, 50 mg= 1 tab(s), Oral, Daily,? ?Not taking  Allergies  No Known Allergies  Social History  Abuse/Neglect  No, 10/16/2019  No, 07/10/2019  Alcohol - Denies Alcohol Use, 10/29/2012  Past, Beer, Liquor, Daily, 10/30/2012  Employment/School  Unemployed, 03/27/2019  Exercise  Exercise duration: 120. Exercise frequency: 5-6 times/week. Self assessment: Good condition. Exercise type: Weight lifting., 03/27/2019  Home/Environment  Lives with friend. Living situation: Home/Independent. Home equipment: CPAP/BiPAP. Alcohol abuse in household: No. Substance abuse in household: No. Smoker in household: No. Feels unsafe at home: No. Safe place to go: Yes. Family/Friends available for support: Yes. Concern for family members at home: No. Major illness in household: No. Financial concerns: No. TV/Computer concerns: No., 03/27/2019  Nutrition/Health  Regular, 03/27/2019  Sexual  Sexually active: Yes. Sexual orientation: Straight or heterosexual. Gender Identity Identifies as male., 03/27/2019  Substance Use  Past, alchol, 10/30/2012  Tobacco  Former smoker, quit more than 30 days ago, N/A, 01/06/2020  Family History  Atrial fibrillation: Father.  HTN (hypertension) 27-APR-2016 23:35:49<$>: Mother.  Health Maintenance  Health Maintenance  ???Pending?(in the next year)  ??? ??OverDue  ??? ? ? ?Diabetes Screening due??and every?  ??? ? ? ?Smoking Cessation due??01/01/20??and every 1??year(s)  ??? ??Due?  ??? ? ? ?Alcohol Misuse Screening due??01/01/20??and every 1??year(s)  ??? ? ? ?ADL Screening due??01/29/20??and every 1??year(s)  ??? ? ? ?Aspirin Therapy for CVD Prevention due??01/29/20??and every 1??year(s)  ??? ? ? ?Lipid Screening due??01/29/20??and every?  ??? ? ? ?Tetanus Vaccine due??01/29/20??and every  10??year(s)  ??? ??Due In Future?  ??? ? ? ?Depression Screening not due until??07/23/20??and every 1??year(s)  ??? ? ? ?Obesity Screening not due until??01/01/21??and every 1??year(s)  ??? ? ? ?Blood Pressure Screening not due until??01/28/21??and every 1??year(s)  ??? ? ? ?Body Mass Index Check not due until??01/28/21??and every 1??year(s)  ???Satisfied?(in the past 1 year)  ??? ??Satisfied?  ??? ? ? ?Blood Pressure Screening on??01/29/20.??Satisfied by Maria Luz Perez RN  ??? ? ? ?Body Mass Index Check on??01/29/20.??Satisfied by Maria Luz Perez RN  ??? ? ? ?Depression Screening on??07/24/19.??Satisfied by Adrianne Hills LPN  ??? ? ? ?Diabetes Screening on??06/26/19.??Satisfied by Xavier Bowser Jr.  ??? ? ? ?Influenza Vaccine on??10/30/19.  ??? ? ? ?Obesity Screening on??01/29/20.??Satisfied by Maria Luz Perez RN  ?

## 2024-11-19 ENCOUNTER — HOSPITAL ENCOUNTER (EMERGENCY)
Facility: HOSPITAL | Age: 54
Discharge: HOME | End: 2024-11-20
Attending: EMERGENCY MEDICINE
Payer: COMMERCIAL

## 2024-11-19 DIAGNOSIS — T07.XXXA MULTIPLE ABRASIONS: Primary | ICD-10-CM

## 2024-11-19 DIAGNOSIS — S50.12XA CONTUSION OF LEFT FOREARM, INITIAL ENCOUNTER: ICD-10-CM
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
## 2024-11-19 DIAGNOSIS — R07.89 CHEST WALL PAIN: ICD-10-CM

## 2024-11-19 DIAGNOSIS — S27.321A CONTUSION OF RIGHT LUNG, INITIAL ENCOUNTER: ICD-10-CM

## 2024-11-19 LAB
ABORH RETYPE: NORMAL
ALBUMIN SERPL-MCNC: 3.9 G/DL (ref 3.5–5)
ALBUMIN/GLOB SERPL: 1.4 RATIO (ref 1.1–2)
ALP SERPL-CCNC: 88 UNIT/L (ref 40–150)
ALT SERPL-CCNC: 51 UNIT/L (ref 0–55)
ANION GAP SERPL CALC-SCNC: 9 MEQ/L
APTT PPP: 27.2 SECONDS (ref 23.2–33.7)
AST SERPL-CCNC: 52 UNIT/L (ref 5–34)
BASOPHILS # BLD AUTO: 0.02 X10(3)/MCL
BASOPHILS NFR BLD AUTO: 0.4 %
BILIRUB SERPL-MCNC: 0.6 MG/DL
BUN SERPL-MCNC: 13.4 MG/DL (ref 8.4–25.7)
CALCIUM SERPL-MCNC: 8.9 MG/DL (ref 8.4–10.2)
CHLORIDE SERPL-SCNC: 101 MMOL/L (ref 98–107)
CO2 SERPL-SCNC: 29 MMOL/L (ref 22–29)
CREAT SERPL-MCNC: 0.97 MG/DL (ref 0.72–1.25)
CREAT/UREA NIT SERPL: 14
EOSINOPHIL # BLD AUTO: 0.05 X10(3)/MCL (ref 0–0.9)
EOSINOPHIL NFR BLD AUTO: 1.1 %
ERYTHROCYTE [DISTWIDTH] IN BLOOD BY AUTOMATED COUNT: 14.1 % (ref 11.5–17)
ETHANOL SERPL-MCNC: <10 MG/DL
GFR SERPLBLD CREATININE-BSD FMLA CKD-EPI: >60 ML/MIN/1.73/M2
GLOBULIN SER-MCNC: 2.8 GM/DL (ref 2.4–3.5)
GLUCOSE SERPL-MCNC: 97 MG/DL (ref 74–100)
GROUP & RH: NORMAL
HCT VFR BLD AUTO: 42.4 % (ref 42–52)
HGB BLD-MCNC: 13.8 G/DL (ref 14–18)
IMM GRANULOCYTES # BLD AUTO: 0.06 X10(3)/MCL (ref 0–0.04)
IMM GRANULOCYTES NFR BLD AUTO: 1.3 %
INDIRECT COOMBS: NORMAL
INR PPP: 1.1
LACTATE SERPL-SCNC: 1.6 MMOL/L (ref 0.5–2.2)
LYMPHOCYTES # BLD AUTO: 0.62 X10(3)/MCL (ref 0.6–4.6)
LYMPHOCYTES NFR BLD AUTO: 13.1 %
MCH RBC QN AUTO: 27.4 PG (ref 27–31)
MCHC RBC AUTO-ENTMCNC: 32.5 G/DL (ref 33–36)
MCV RBC AUTO: 84.3 FL (ref 80–94)
MONOCYTES # BLD AUTO: 0.55 X10(3)/MCL (ref 0.1–1.3)
MONOCYTES NFR BLD AUTO: 11.6 %
NEUTROPHILS # BLD AUTO: 3.43 X10(3)/MCL (ref 2.1–9.2)
NEUTROPHILS NFR BLD AUTO: 72.5 %
NRBC BLD AUTO-RTO: 0 %
PLATELET # BLD AUTO: 175 X10(3)/MCL (ref 130–400)
PMV BLD AUTO: 10.5 FL (ref 7.4–10.4)
POTASSIUM SERPL-SCNC: 3.5 MMOL/L (ref 3.5–5.1)
PROT SERPL-MCNC: 6.7 GM/DL (ref 6.4–8.3)
PROTHROMBIN TIME: 14.4 SECONDS (ref 12.5–14.5)
RBC # BLD AUTO: 5.03 X10(6)/MCL (ref 4.7–6.1)
SODIUM SERPL-SCNC: 139 MMOL/L (ref 136–145)
SPECIMEN OUTDATE: NORMAL
WBC # BLD AUTO: 4.73 X10(3)/MCL (ref 4.5–11.5)

## 2024-11-19 PROCEDURE — 99282 EMERGENCY DEPT VISIT SF MDM: CPT | Mod: ,,,

## 2024-11-19 PROCEDURE — 96374 THER/PROPH/DIAG INJ IV PUSH: CPT

## 2024-11-19 PROCEDURE — 90471 IMMUNIZATION ADMIN: CPT | Performed by: EMERGENCY MEDICINE

## 2024-11-19 PROCEDURE — 63600175 PHARM REV CODE 636 W HCPCS: Performed by: EMERGENCY MEDICINE

## 2024-11-19 PROCEDURE — 25500020 PHARM REV CODE 255: Performed by: EMERGENCY MEDICINE

## 2024-11-19 PROCEDURE — 86901 BLOOD TYPING SEROLOGIC RH(D): CPT | Performed by: EMERGENCY MEDICINE

## 2024-11-19 PROCEDURE — 96375 TX/PRO/DX INJ NEW DRUG ADDON: CPT

## 2024-11-19 PROCEDURE — 83605 ASSAY OF LACTIC ACID: CPT | Performed by: EMERGENCY MEDICINE

## 2024-11-19 PROCEDURE — 85610 PROTHROMBIN TIME: CPT | Performed by: EMERGENCY MEDICINE

## 2024-11-19 PROCEDURE — G0390 TRAUMA RESPONS W/HOSP CRITI: HCPCS

## 2024-11-19 PROCEDURE — 85730 THROMBOPLASTIN TIME PARTIAL: CPT | Performed by: EMERGENCY MEDICINE

## 2024-11-19 PROCEDURE — 80053 COMPREHEN METABOLIC PANEL: CPT | Performed by: EMERGENCY MEDICINE

## 2024-11-19 PROCEDURE — 12011 RPR F/E/E/N/L/M 2.5 CM/<: CPT

## 2024-11-19 PROCEDURE — 85025 COMPLETE CBC W/AUTO DIFF WBC: CPT | Performed by: EMERGENCY MEDICINE

## 2024-11-19 PROCEDURE — 82077 ASSAY SPEC XCP UR&BREATH IA: CPT | Performed by: EMERGENCY MEDICINE

## 2024-11-19 PROCEDURE — 99285 EMERGENCY DEPT VISIT HI MDM: CPT | Mod: 25

## 2024-11-19 PROCEDURE — 90715 TDAP VACCINE 7 YRS/> IM: CPT | Performed by: EMERGENCY MEDICINE

## 2024-11-19 RX ORDER — ONDANSETRON HYDROCHLORIDE 2 MG/ML
INJECTION, SOLUTION INTRAVENOUS CODE/TRAUMA/SEDATION MEDICATION
Status: COMPLETED | OUTPATIENT
Start: 2024-11-19 | End: 2024-11-19

## 2024-11-19 RX ORDER — LIDOCAINE HYDROCHLORIDE 20 MG/ML
5 INJECTION, SOLUTION EPIDURAL; INFILTRATION; INTRACAUDAL; PERINEURAL
Status: DISCONTINUED | OUTPATIENT
Start: 2024-11-19 | End: 2024-11-20 | Stop reason: HOSPADM

## 2024-11-19 RX ORDER — CEFAZOLIN SODIUM 1 G/3ML
INJECTION, POWDER, FOR SOLUTION INTRAMUSCULAR; INTRAVENOUS CODE/TRAUMA/SEDATION MEDICATION
Status: COMPLETED | OUTPATIENT
Start: 2024-11-19 | End: 2024-11-19

## 2024-11-19 RX ORDER — CEFAZOLIN SODIUM 1 G/3ML
INJECTION, POWDER, FOR SOLUTION INTRAMUSCULAR; INTRAVENOUS
Status: DISCONTINUED
Start: 2024-11-19 | End: 2024-11-20 | Stop reason: HOSPADM

## 2024-11-19 RX ORDER — ONDANSETRON HYDROCHLORIDE 2 MG/ML
INJECTION, SOLUTION INTRAVENOUS
Status: DISCONTINUED
Start: 2024-11-19 | End: 2024-11-20 | Stop reason: HOSPADM

## 2024-11-19 RX ADMIN — IOHEXOL 100 ML: 350 INJECTION, SOLUTION INTRAVENOUS at 08:11

## 2024-11-19 RX ADMIN — CEFAZOLIN 2 G: 330 INJECTION, POWDER, FOR SOLUTION INTRAMUSCULAR; INTRAVENOUS at 08:11

## 2024-11-19 RX ADMIN — TETANUS TOXOID, REDUCED DIPHTHERIA TOXOID AND ACELLULAR PERTUSSIS VACCINE, ADSORBED 0.5 ML: 5; 2.5; 8; 8; 2.5 SUSPENSION INTRAMUSCULAR at 08:11

## 2024-11-19 RX ADMIN — ONDANSETRON 4 MG: 2 INJECTION INTRAMUSCULAR; INTRAVENOUS at 07:11

## 2024-11-19 NOTE — LETTER
November 19, 2024         [address] [facility] [address]  Phone: [phone]       Patient: Jon Frazier   YOB: 1970  Date of Visit: 11/19/2024    To Whom It May Concern:    NILSA Frazier  was at [facility] on 11/19/2024. The patient may return to work/school on 11/25/2024 with no restrictions. If you have any questions or concerns, or if I can be of further assistance, please do not hesitate to contact me.    Sincerely,      Batsheva Melton, AGACNP-BC, FNP-BC  Trauma Surgery  [facility]

## 2024-11-20 VITALS
BODY MASS INDEX: 32.33 KG/M2 | TEMPERATURE: 99 F | HEIGHT: 75 IN | SYSTOLIC BLOOD PRESSURE: 140 MMHG | HEART RATE: 104 BPM | RESPIRATION RATE: 18 BRPM | WEIGHT: 260 LBS | OXYGEN SATURATION: 95 % | DIASTOLIC BLOOD PRESSURE: 81 MMHG

## 2024-11-20 RX ORDER — METHOCARBAMOL 750 MG/1
750 TABLET, FILM COATED ORAL EVERY 8 HOURS PRN
Qty: 18 TABLET | Refills: 0 | Status: SHIPPED | OUTPATIENT
Start: 2024-11-20

## 2024-11-20 RX ORDER — HYDROCODONE BITARTRATE AND ACETAMINOPHEN 7.5; 325 MG/1; MG/1
1 TABLET ORAL EVERY 6 HOURS PRN
Qty: 15 TABLET | Refills: 0 | Status: SHIPPED | OUTPATIENT
Start: 2024-11-20
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
## 2024-11-21 ENCOUNTER — OFFICE VISIT (OUTPATIENT)
Facility: CLINIC | Age: 54
End: 2024-11-21
Payer: COMMERCIAL

## 2024-11-21 ENCOUNTER — TELEPHONE (OUTPATIENT)
Facility: CLINIC | Age: 54
End: 2024-11-21
Payer: COMMERCIAL

## 2024-11-21 VITALS
WEIGHT: 259.94 LBS | BODY MASS INDEX: 32.32 KG/M2 | SYSTOLIC BLOOD PRESSURE: 121 MMHG | HEIGHT: 75 IN | DIASTOLIC BLOOD PRESSURE: 72 MMHG | HEART RATE: 76 BPM

## 2024-11-21 DIAGNOSIS — M25.512 ACUTE PAIN OF LEFT SHOULDER: Primary | ICD-10-CM

## 2024-11-21 PROCEDURE — 3078F DIAST BP <80 MM HG: CPT | Mod: CPTII,S$GLB,, | Performed by: NURSE PRACTITIONER

## 2024-11-21 PROCEDURE — 99212 OFFICE O/P EST SF 10 MIN: CPT | Mod: S$GLB,,, | Performed by: NURSE PRACTITIONER

## 2024-11-21 PROCEDURE — 1159F MED LIST DOCD IN RCRD: CPT | Mod: CPTII,S$GLB,, | Performed by: NURSE PRACTITIONER

## 2024-11-21 PROCEDURE — 99999 PR PBB SHADOW E&M-EST. PATIENT-LVL IV: CPT | Mod: PBBFAC,,,

## 2024-11-21 PROCEDURE — 3008F BODY MASS INDEX DOCD: CPT | Mod: CPTII,S$GLB,, | Performed by: NURSE PRACTITIONER

## 2024-11-21 PROCEDURE — 3074F SYST BP LT 130 MM HG: CPT | Mod: CPTII,S$GLB,, | Performed by: NURSE PRACTITIONER

## 2024-11-21 PROCEDURE — 4010F ACE/ARB THERAPY RXD/TAKEN: CPT | Mod: CPTII,S$GLB,, | Performed by: NURSE PRACTITIONER

## 2024-11-21 RX ORDER — LEVOTHYROXINE, LIOTHYRONINE 38; 9 UG/1; UG/1
60 TABLET ORAL
COMMUNITY
Start: 2024-09-11

## 2024-11-21 RX ORDER — ESOMEPRAZOLE MAGNESIUM 40 MG/1
40 CAPSULE, DELAYED RELEASE ORAL
COMMUNITY
Start: 2024-09-12

## 2024-11-21 RX ORDER — LOSARTAN POTASSIUM 25 MG/1
25 TABLET ORAL
COMMUNITY
Start: 2024-09-09

## 2024-11-21 RX ORDER — CETIRIZINE HYDROCHLORIDE 10 MG/1
10 TABLET ORAL
COMMUNITY
Start: 2024-11-11

## 2024-11-21 RX ORDER — TESTOSTERONE CYPIONATE 200 MG/ML
200 INJECTION, SOLUTION INTRAMUSCULAR
COMMUNITY
Start: 2024-11-04

## 2024-11-21 RX ORDER — EXEMESTANE 25 MG/1
TABLET ORAL
COMMUNITY
Start: 2024-11-11

## 2024-11-21 RX ORDER — LACTULOSE 10 G/15ML
15 SOLUTION ORAL; RECTAL
COMMUNITY
Start: 2024-11-18

## 2024-11-21 RX ORDER — MODAFINIL 100 MG/1
100-200 TABLET ORAL EVERY MORNING
COMMUNITY
Start: 2024-10-26

## 2024-11-21 RX ORDER — FAMOTIDINE 40 MG/1
40 TABLET, FILM COATED ORAL
COMMUNITY
Start: 2024-11-11

## 2024-11-21 RX ORDER — CLOPIDOGREL BISULFATE 75 MG/1
75 TABLET ORAL
COMMUNITY

## 2024-11-21 RX ORDER — HYDROCHLOROTHIAZIDE 12.5 MG/1
12.5 TABLET ORAL
COMMUNITY
Start: 2024-09-12

## 2024-11-21 RX ORDER — SILDENAFIL 100 MG/1
100 TABLET, FILM COATED ORAL
COMMUNITY
Start: 2024-07-10

## 2024-11-21 RX ORDER — PANTOPRAZOLE SODIUM 40 MG/1
40 TABLET, DELAYED RELEASE ORAL EVERY MORNING
COMMUNITY

## 2024-11-21 RX ORDER — LINACLOTIDE 290 UG/1
290 CAPSULE, GELATIN COATED ORAL
COMMUNITY
Start: 2024-10-17

## 2024-11-21 RX ORDER — PRAMIPEXOLE DIHYDROCHLORIDE 0.25 MG/1
0.25 TABLET ORAL NIGHTLY
COMMUNITY
Start: 2024-11-11

## 2024-11-21 RX ORDER — DEXTROAMPHETAMINE SACCHARATE, AMPHETAMINE ASPARTATE, DEXTROAMPHETAMINE SULFATE AND AMPHETAMINE SULFATE 5; 5; 5; 5 MG/1; MG/1; MG/1; MG/1
1 TABLET ORAL 3 TIMES DAILY
COMMUNITY
Start: 2024-11-15
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
## 2024-11-25 ENCOUNTER — TELEPHONE (OUTPATIENT)
Facility: CLINIC | Age: 54
End: 2024-11-25
Payer: COMMERCIAL
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
## 2024-11-25 NOTE — TELEPHONE ENCOUNTER
Advised pt to try try tylenol, ibuprofen per serg PEARL orders pt states that doesn't work so he has been taking aleve. Also made pt aware he can try biofreeze, icy/hot patches to buy time until he can see his pcp. Pt verbalized understanding

## 2025-01-29 DIAGNOSIS — G40.909 SEIZURE DISORDER: Primary | ICD-10-CM

## 2025-03-28 ENCOUNTER — OFFICE VISIT (OUTPATIENT)
Dept: NEUROLOGY | Facility: CLINIC | Age: 55
End: 2025-03-28
Payer: COMMERCIAL

## 2025-03-28 VITALS
BODY MASS INDEX: 34.82 KG/M2 | WEIGHT: 280 LBS | SYSTOLIC BLOOD PRESSURE: 127 MMHG | HEIGHT: 75 IN | DIASTOLIC BLOOD PRESSURE: 90 MMHG

## 2025-03-28 DIAGNOSIS — R55 POSTURAL DIZZINESS WITH NEAR SYNCOPE: Primary | ICD-10-CM

## 2025-03-28 DIAGNOSIS — G40.909 SEIZURE DISORDER: ICD-10-CM

## 2025-03-28 DIAGNOSIS — R42 POSTURAL DIZZINESS WITH NEAR SYNCOPE: Primary | ICD-10-CM

## 2025-03-28 PROCEDURE — 99999 PR PBB SHADOW E&M-EST. PATIENT-LVL IV: CPT | Mod: PBBFAC,,, | Performed by: SPECIALIST

## 2025-03-28 RX ORDER — BACLOFEN 20 MG/1
20 TABLET ORAL 3 TIMES DAILY
COMMUNITY
Start: 2025-03-26
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
## 2025-03-28 NOTE — PATIENT INSTRUCTIONS
I suggest he stop mirapex/pramipexole    I suggest HCTZ fluid pill 3 times per week     I suggest lesser dose of baclofen and to take it prn instead of regularly     I suggest either more modafinil without amphetamine   Or a long acting amphetamine without modafinil

## 2025-07-07 ENCOUNTER — HOSPITAL ENCOUNTER (OUTPATIENT)
Dept: RADIOLOGY | Facility: HOSPITAL | Age: 55
Discharge: HOME | End: 2025-07-07
Payer: COMMERCIAL

## 2025-07-07 DIAGNOSIS — K59.00 COLONIC CONSTIPATION: ICD-10-CM

## 2025-07-07 DIAGNOSIS — R15.0 INCOMPLETE DEFECATION: ICD-10-CM

## 2025-07-07 DIAGNOSIS — K21.9 GASTROESOPHAGEAL REFLUX DISEASE, UNSPECIFIED WHETHER ESOPHAGITIS PRESENT: ICD-10-CM

## 2025-07-07 PROCEDURE — 74018 RADEX ABDOMEN 1 VIEW: CPT | Mod: TC
